# Patient Record
Sex: MALE | Race: WHITE | Employment: OTHER | URBAN - METROPOLITAN AREA
[De-identification: names, ages, dates, MRNs, and addresses within clinical notes are randomized per-mention and may not be internally consistent; named-entity substitution may affect disease eponyms.]

---

## 2017-01-04 PROBLEM — M10.00 IDIOPATHIC GOUT, UNSPECIFIED CHRONICITY, UNSPECIFIED SITE: Status: ACTIVE | Noted: 2017-01-04

## 2017-06-27 ENCOUNTER — LAB ENCOUNTER (OUTPATIENT)
Dept: LAB | Age: 57
End: 2017-06-27
Attending: PHYSICIAN ASSISTANT
Payer: COMMERCIAL

## 2017-06-27 DIAGNOSIS — L08.9 STAPH SKIN INFECTION: ICD-10-CM

## 2017-06-27 DIAGNOSIS — B95.8 STAPH SKIN INFECTION: ICD-10-CM

## 2017-06-27 PROCEDURE — 87205 SMEAR GRAM STAIN: CPT

## 2017-06-27 PROCEDURE — 87070 CULTURE OTHR SPECIMN AEROBIC: CPT

## 2017-07-11 PROCEDURE — 81003 URINALYSIS AUTO W/O SCOPE: CPT | Performed by: INTERNAL MEDICINE

## 2017-08-30 ENCOUNTER — HOSPITAL ENCOUNTER (OUTPATIENT)
Age: 57
Discharge: HOME | End: 2017-08-30
Payer: COMMERCIAL

## 2017-08-30 VITALS
TEMPERATURE: 98 F | SYSTOLIC BLOOD PRESSURE: 131 MMHG | DIASTOLIC BLOOD PRESSURE: 71 MMHG | HEART RATE: 67 BPM | OXYGEN SATURATION: 97 % | RESPIRATION RATE: 16 BRPM

## 2017-08-30 DIAGNOSIS — L30.9 PERIOCULAR DERMATITIS: ICD-10-CM

## 2017-08-30 DIAGNOSIS — H10.32 ACUTE CONJUNCTIVITIS OF LEFT EYE, UNSPECIFIED ACUTE CONJUNCTIVITIS TYPE: Primary | ICD-10-CM

## 2017-08-30 PROCEDURE — 99213 OFFICE O/P EST LOW 20 MIN: CPT

## 2017-08-30 PROCEDURE — 99214 OFFICE O/P EST MOD 30 MIN: CPT

## 2017-08-30 RX ORDER — OFLOXACIN 3 MG/ML
2 SOLUTION/ DROPS OPHTHALMIC EVERY 4 HOURS
Qty: 10 ML | Refills: 0 | Status: SHIPPED | OUTPATIENT
Start: 2017-08-30 | End: 2018-01-08 | Stop reason: ALTCHOICE

## 2017-08-30 NOTE — ED PROVIDER NOTES
Patient Seen in: [facility]    History   Patient presents with:  Conjunctivitis    Stated Complaint: 2 DAYS PINK EYE    HPI    Patient is a pleasant 80-year-old male.   Patient arrives for evaluation of acute injection of the lef daily   probenecid 500 MG Oral Tab,  Take 2 tablets (1,000 mg total) by mouth 2 (two) times daily with meals. dronedarone HCl (MULTAQ) 400 MG Oral Tab,  Take 400 mg by mouth 2 (two) times daily with meals.    Metoprolol Succinate ER (TOPROL-XL) 50 MG Oral Supple, full range of motion, no thyromegaly or lymphadenopathy. Eye examination: EOMs are intact, normal conjunctival  ENT: Diffuse uniform red discoloration of the left conjunctivo-. No obvious foreign body with eyelid eversion. EOMs are intact.   PERR (four) hours. , Normal, Disp-10 mL, R-0    metRONIDAZOLE 0.75 % External Cream  Apply 1 Application topically 2 (two) times daily. , Normal, Disp-45 g, R-0

## 2017-08-30 NOTE — ED INITIAL ASSESSMENT (HPI)
Complains of pain and redness on left eye since yesterday. Stays painful and \"feels like sand on eye\" and was tearing a lot.

## 2018-01-11 PROBLEM — B35.1 ONYCHOMYCOSIS: Status: ACTIVE | Noted: 2018-01-11

## 2018-01-17 PROBLEM — Z51.81 ENCOUNTER FOR THERAPEUTIC DRUG MONITORING: Status: ACTIVE | Noted: 2018-01-17

## 2018-01-19 PROCEDURE — 82607 VITAMIN B-12: CPT | Performed by: INTERNAL MEDICINE

## 2018-01-19 PROCEDURE — 82397 CHEMILUMINESCENT ASSAY: CPT | Performed by: INTERNAL MEDICINE

## 2018-01-19 PROCEDURE — 84402 ASSAY OF FREE TESTOSTERONE: CPT | Performed by: INTERNAL MEDICINE

## 2018-01-19 PROCEDURE — 84403 ASSAY OF TOTAL TESTOSTERONE: CPT | Performed by: INTERNAL MEDICINE

## 2018-03-24 ENCOUNTER — HOSPITAL ENCOUNTER (OUTPATIENT)
Age: 58
Discharge: HOME | End: 2018-03-24
Payer: COMMERCIAL

## 2018-03-24 VITALS
SYSTOLIC BLOOD PRESSURE: 126 MMHG | TEMPERATURE: 98 F | DIASTOLIC BLOOD PRESSURE: 80 MMHG | OXYGEN SATURATION: 99 % | HEART RATE: 68 BPM | RESPIRATION RATE: 16 BRPM

## 2018-03-24 DIAGNOSIS — H20.9 IRITIS: Primary | ICD-10-CM

## 2018-03-24 DIAGNOSIS — H10.32 ACUTE CONJUNCTIVITIS OF LEFT EYE, UNSPECIFIED ACUTE CONJUNCTIVITIS TYPE: ICD-10-CM

## 2018-03-24 PROCEDURE — 99213 OFFICE O/P EST LOW 20 MIN: CPT

## 2018-03-24 PROCEDURE — 99214 OFFICE O/P EST MOD 30 MIN: CPT

## 2018-03-24 RX ORDER — CIPROFLOXACIN HYDROCHLORIDE 3.5 MG/ML
2 SOLUTION/ DROPS TOPICAL
Qty: 1 BOTTLE | Refills: 0 | Status: SHIPPED | OUTPATIENT
Start: 2018-03-24 | End: 2018-04-02 | Stop reason: ALTCHOICE

## 2018-03-24 RX ORDER — TETRACAINE HYDROCHLORIDE 5 MG/ML
1 SOLUTION OPHTHALMIC ONCE
Status: COMPLETED | OUTPATIENT
Start: 2018-03-24 | End: 2018-03-24

## 2018-03-24 NOTE — ED INITIAL ASSESSMENT (HPI)
Onset Wednesday right eye redness. Washing with baby shampoo for the past 3 days, not any better. Was seen by [facility] last summer and was told to washed eye with baby shampoo but has not been doing it until 3 days ago.

## 2018-03-24 NOTE — ED PROVIDER NOTES
Patient Seen in: [facility]    History   Patient presents with:  Redness    Stated Complaint: Left eye redness    HPI    49-year-old male with extensive history of eye issues here with complaint of left eye redness with irritati Packs/day: 0.50      Years: 30.00        Types: Cigars     Quit date: 8/1/1994  Smokeless tobacco: Never Used                      Comment: cigars only per pt. Anmol Solero Patient has not had            tobacco for 7 months (see Patient Ema but instructed patient to make an appointment in [place] for Monday or Tuesday with an optometrist if unable to get in with ophthalmology.     ED Course as of Mar 24 1233  ------------------------------------------------------------       [facility]     Herb

## 2018-05-13 ENCOUNTER — HOSPITAL ENCOUNTER (OUTPATIENT)
Age: 58
Discharge: HOME | End: 2018-05-13
Payer: COMMERCIAL

## 2018-05-13 VITALS
SYSTOLIC BLOOD PRESSURE: 130 MMHG | HEART RATE: 60 BPM | DIASTOLIC BLOOD PRESSURE: 66 MMHG | OXYGEN SATURATION: 97 % | TEMPERATURE: 97 F | RESPIRATION RATE: 16 BRPM

## 2018-05-13 DIAGNOSIS — H20.9 IRITIS: ICD-10-CM

## 2018-05-13 DIAGNOSIS — H10.32 ACUTE CONJUNCTIVITIS OF LEFT EYE, UNSPECIFIED ACUTE CONJUNCTIVITIS TYPE: Primary | ICD-10-CM

## 2018-05-13 PROCEDURE — 99213 OFFICE O/P EST LOW 20 MIN: CPT

## 2018-05-13 RX ORDER — CIPROFLOXACIN HYDROCHLORIDE 3.5 MG/ML
2 SOLUTION/ DROPS TOPICAL
Qty: 1 BOTTLE | Refills: 0 | Status: SHIPPED | OUTPATIENT
Start: 2018-05-13 | End: 2018-05-23

## 2018-05-13 RX ORDER — TETRACAINE HYDROCHLORIDE 5 MG/ML
1 SOLUTION OPHTHALMIC ONCE
Status: COMPLETED | OUTPATIENT
Start: 2018-05-13 | End: 2018-05-13

## 2018-05-13 NOTE — ED PROVIDER NOTES
Patient Seen in: [facility] In [address] [place] [address],[address]    History   Patient presents with:  Eye Problem    Stated Complaint: RED RIGHT EYE/PAIN    HPI    CHIEF COMPLAINT: Eye redness and irritation    HISTORY OF PRESENT ILLNESS: Patient is a 51-year-old m COLONOSCOPY, POSSIBLE BIOPSY,                POSSIBLE POLYPECTOMY 07654;  Surgeon: Sushila Shelley MD;  Location: [address] [place] [address]    Family history reviewed and is not pertinent to presenting problem.     Smoking status: Former Smoke chamber exam shows no evidence of hyphema or cell/flare.   No dendrites; normal funduscopic exam.  Other eye unaffected    ENT: Normal oropharynx, normal nasal exam.    ED Course   Labs Reviewed - No data to display  Patient symptoms are consistent with iri

## 2018-05-13 NOTE — ED INITIAL ASSESSMENT (HPI)
Pt began yesterday with rt eye irritation, and then got worse during the night. Now it is red, irritated and tearing.   Had similar issue with left eye 1 month ago

## 2018-10-16 ENCOUNTER — HOSPITAL ENCOUNTER (EMERGENCY)
Age: 58
Discharge: HOME | End: 2018-10-16
Attending: EMERGENCY MEDICINE
Payer: COMMERCIAL

## 2018-10-16 ENCOUNTER — APPOINTMENT (OUTPATIENT)
Dept: GENERAL RADIOLOGY | Age: 58
End: 2018-10-16
Attending: PHYSICIAN ASSISTANT
Payer: COMMERCIAL

## 2018-10-16 VITALS
OXYGEN SATURATION: 98 % | TEMPERATURE: 98 F | WEIGHT: 315 LBS | BODY MASS INDEX: 40.43 KG/M2 | HEIGHT: 74 IN | HEART RATE: 54 BPM | SYSTOLIC BLOOD PRESSURE: 134 MMHG | RESPIRATION RATE: 16 BRPM | DIASTOLIC BLOOD PRESSURE: 76 MMHG

## 2018-10-16 DIAGNOSIS — S62.635B OPEN DISPLACED FRACTURE OF DISTAL PHALANX OF LEFT RING FINGER, INITIAL ENCOUNTER: Primary | ICD-10-CM

## 2018-10-16 PROCEDURE — 73140 X-RAY EXAM OF FINGER(S): CPT | Performed by: PHYSICIAN ASSISTANT

## 2018-10-16 PROCEDURE — 99283 EMERGENCY DEPT VISIT LOW MDM: CPT

## 2018-10-16 PROCEDURE — 99284 EMERGENCY DEPT VISIT MOD MDM: CPT

## 2018-10-16 PROCEDURE — 90471 IMMUNIZATION ADMIN: CPT

## 2018-10-16 RX ORDER — BUPIVACAINE HYDROCHLORIDE 5 MG/ML
INJECTION, SOLUTION EPIDURAL; INTRACAUDAL
Status: COMPLETED
Start: 2018-10-16 | End: 2018-10-16

## 2018-10-16 RX ORDER — CEPHALEXIN 500 MG/1
500 CAPSULE ORAL 4 TIMES DAILY
Qty: 40 CAPSULE | Refills: 0 | Status: SHIPPED | OUTPATIENT
Start: 2018-10-16 | End: 2018-10-26

## 2018-10-16 RX ORDER — CEPHALEXIN 250 MG/5ML
500 POWDER, FOR SUSPENSION ORAL 4 TIMES DAILY
Qty: 400 ML | Refills: 0 | Status: SHIPPED | OUTPATIENT
Start: 2018-10-16 | End: 2018-10-16 | Stop reason: CLARIF

## 2018-10-16 NOTE — ED PROVIDER NOTES
Patient Seen in: [facility] [place]    History   Patient presents with:  Laceration Abrasion (integumentary)    Stated Complaint: lt hand lac 3rd digit    HPI    CHIEF COMPLAINT: Left Ring finger injury    HISTORY OF PRESENT MARK Packs/day: 0.50        Years: 30.00        Pack years: 15        Types: Cigars        Quit date: 1994        Years since quittin.2      Smokeless tobacco: Never Used      Tobacco comment: cigars only per pt. Antonio Rodriguez Patient has not had tobacco for Patient will be discharged home to follow-up with orthopedics within the next 1-2 days. Patient will leave the Gelfoam in place along with the dressing and should not remove the dressing until orthopedics.   Patient was placed on Keflex 500 mg 4 times servando

## 2018-10-18 NOTE — H&P (VIEW-ONLY)
HISTORY AND PHYSICAL    Sameer Hammond  10/18/2018   CHIEF COMPLAINT   Patient presents with:  Finger Pain: DOI 10/16/2018. patient finger was injured by mitre saw. no pain. taking Antibiotics Kefelx.       HISTORY OF PRESENT ILLNESS   Eddie Norris 400 mg by mouth 2 (two) times daily with meals. Disp:  Rfl:    Metoprolol Succinate ER (TOPROL-XL) 50 MG Oral Take 50 mg by mouth daily. Disp:  Rfl:    aspirin (BABY ASPIRIN) 81 MG Oral Chew Tab Chew 81 mg by mouth daily. Two tablets.   Disp:  Rfl:    Multi lamin      Children: 2      Exercise: walks      Employment: [employer]      Caffeine intake:2 pot coffee/d, [employer]            Family History:  Family History   Problem Relation Age of Onset   • Cancer Father Motion:  Elbows: Normal carrying angle and a full arc of motion into flexion and extension, supination and pronation.   Wrists: Full and symmetric arc of motion  All fingers demonstrated a full, free and uninhibited range of motion with the exception of the

## 2018-10-25 ENCOUNTER — HOSPITAL ENCOUNTER (OUTPATIENT)
Facility: HOSPITAL | Age: 58
Setting detail: HOSPITAL OUTPATIENT SURGERY
Discharge: HOME | End: 2018-10-25
Attending: ORTHOPAEDIC SURGERY | Admitting: ORTHOPAEDIC SURGERY
Payer: COMMERCIAL

## 2018-10-25 VITALS
BODY MASS INDEX: 40.43 KG/M2 | OXYGEN SATURATION: 99 % | DIASTOLIC BLOOD PRESSURE: 63 MMHG | HEIGHT: 74 IN | SYSTOLIC BLOOD PRESSURE: 129 MMHG | WEIGHT: 315 LBS | RESPIRATION RATE: 18 BRPM | HEART RATE: 50 BPM | TEMPERATURE: 97 F

## 2018-10-25 DIAGNOSIS — S61.319A LACERATION OF NAIL BED OF FINGER, INITIAL ENCOUNTER: ICD-10-CM

## 2018-10-25 PROCEDURE — 0HQQXZZ REPAIR FINGER NAIL, EXTERNAL APPROACH: ICD-10-PCS | Performed by: ORTHOPAEDIC SURGERY

## 2018-10-25 RX ORDER — HYDROCODONE BITARTRATE AND ACETAMINOPHEN 5; 325 MG/1; MG/1
1 TABLET ORAL EVERY 6 HOURS PRN
Qty: 7 TABLET | Refills: 0 | Status: SHIPPED | OUTPATIENT
Start: 2018-10-25 | End: 2018-11-01 | Stop reason: ALTCHOICE

## 2018-10-25 RX ORDER — LIDOCAINE HYDROCHLORIDE 10 MG/ML
INJECTION, SOLUTION INFILTRATION; PERINEURAL AS NEEDED
Status: DISCONTINUED | OUTPATIENT
Start: 2018-10-25 | End: 2018-10-25 | Stop reason: HOSPADM

## 2018-10-25 RX ORDER — BUPIVACAINE HYDROCHLORIDE 5 MG/ML
INJECTION, SOLUTION EPIDURAL; INTRACAUDAL AS NEEDED
Status: DISCONTINUED | OUTPATIENT
Start: 2018-10-25 | End: 2018-10-25 | Stop reason: HOSPADM

## 2018-10-25 RX ORDER — BACITRACIN 50000 [USP'U]/1
INJECTION, POWDER, LYOPHILIZED, FOR SOLUTION INTRAMUSCULAR AS NEEDED
Status: DISCONTINUED | OUTPATIENT
Start: 2018-10-25 | End: 2018-10-25 | Stop reason: HOSPADM

## 2018-10-25 NOTE — INTERVAL H&P NOTE
Pre-op Diagnosis: Laceration of nail bed of finger, initial encounter [S61.319A]    The above referenced H&P was reviewed by Anita De La Fuente MD on 10/25/2018, the patient was examined and no significant changes have occurred in the patient's condition sin

## 2018-10-25 NOTE — BRIEF OP NOTE
Pre-Operative Diagnosis: Laceration of nail bed of finger, initial encounter [S61.319A]     Post-Operative Diagnosis: Lacerartion of nail bed of finger, initial encounter      Procedure Performed:   Procedure(s):  INCISION AND DRAINAGE OF 4TH FINGER LEFT H

## 2018-10-26 NOTE — OPERATIVE REPORT
Operative Note    Patient: Divine Romero MRN: [id number]     YOB: 1960  Age: 62year old  Sex: male    Unit: [facility] Room/Bed: [facility] PRE ASCC/ PRE ASCC * Location: [facility]     Date of Procedure: 10/25/2018     Preoperative to the patient and they wished to proceed. Description of Procedure: The patient was identified in preoperative holding area and his left fourth finger was marked.   He was brought back to the operating room and a timeout was performed identifying th the finger was perfused before covering the tip of the finger with 4 x 4 gauze. This was secured using finger Kerlix and Coban. The patient tolerated the procedure well was transferred the PACU in stable condition.   There were no apparent complications

## 2018-11-05 PROBLEM — M79.642 LEFT HAND PAIN: Status: ACTIVE | Noted: 2018-11-05

## 2019-08-13 PROCEDURE — 81003 URINALYSIS AUTO W/O SCOPE: CPT | Performed by: INTERNAL MEDICINE

## 2019-08-13 PROCEDURE — 86803 HEPATITIS C AB TEST: CPT | Performed by: INTERNAL MEDICINE

## 2019-08-14 PROBLEM — E88.81 METABOLIC SYNDROME: Status: ACTIVE | Noted: 2019-08-14

## 2019-08-14 PROBLEM — E88.810 METABOLIC SYNDROME: Status: ACTIVE | Noted: 2019-08-14

## 2021-07-16 ENCOUNTER — LAB ENCOUNTER (OUTPATIENT)
Dept: LAB | Age: 61
End: 2021-07-16
Attending: INTERNAL MEDICINE
Payer: COMMERCIAL

## 2021-07-16 DIAGNOSIS — I48.91 A-FIB (HCC): ICD-10-CM

## 2021-07-17 LAB — SARS-COV-2 RNA RESP QL NAA+PROBE: NOT DETECTED

## 2021-07-19 ENCOUNTER — HOSPITAL ENCOUNTER (OUTPATIENT)
Dept: INTERVENTIONAL RADIOLOGY/VASCULAR | Facility: HOSPITAL | Age: 61
Discharge: HOME | End: 2021-07-19
Attending: INTERNAL MEDICINE | Admitting: INTERNAL MEDICINE
Payer: COMMERCIAL

## 2021-07-19 VITALS
RESPIRATION RATE: 16 BRPM | SYSTOLIC BLOOD PRESSURE: 117 MMHG | TEMPERATURE: 98 F | DIASTOLIC BLOOD PRESSURE: 68 MMHG | HEART RATE: 50 BPM | WEIGHT: 309 LBS | HEIGHT: 74 IN | OXYGEN SATURATION: 100 % | BODY MASS INDEX: 39.66 KG/M2

## 2021-07-19 DIAGNOSIS — I48.91 ATRIAL FIBRILLATION (HCC): ICD-10-CM

## 2021-07-19 DIAGNOSIS — I48.91 A-FIB (HCC): Primary | ICD-10-CM

## 2021-07-19 PROCEDURE — 99152 MOD SED SAME PHYS/QHP 5/>YRS: CPT

## 2021-07-19 PROCEDURE — 93010 ELECTROCARDIOGRAM REPORT: CPT | Performed by: INTERNAL MEDICINE

## 2021-07-19 PROCEDURE — 5A2204Z RESTORATION OF CARDIAC RHYTHM, SINGLE: ICD-10-PCS | Performed by: INTERNAL MEDICINE

## 2021-07-19 PROCEDURE — 92960 CARDIOVERSION ELECTRIC EXT: CPT

## 2021-07-19 PROCEDURE — 93005 ELECTROCARDIOGRAM TRACING: CPT

## 2021-07-19 RX ORDER — SODIUM CHLORIDE 9 MG/ML
INJECTION, SOLUTION INTRAVENOUS CONTINUOUS
Status: DISCONTINUED | OUTPATIENT
Start: 2021-07-19 | End: 2021-07-19

## 2021-07-19 NOTE — H&P
[facility]   Progress Note           ASSESSMENT:    1. Paroxysmal atrial fibrillation, currently in atrial fibrillation. 2. Increased BMI. 3. Obstructive sleep apnea, on CPAP. 4. Hypertension. 5. Dyslipidemia. 6. Prediabetes. POSSIBLE BIOPSY, POSSIBLE POLYPECTOMY 58773;  Surgeon: García Navarrete MD;  Location: [address] [place] [address]             Family History   Problem Relation Age of Onset   • Cancer Father           gastric ca   • Diabetes Maternal Grandmother     • Cancer       ROS:   Constitutional: negative for fevers  Eyes: negative for visual disturbance  Ears, nose, mouth, throat, and face: negative for epistaxis  Respiratory: negative for dyspnea on exertion  Cardiovascular: negative for chest pain  Gastrointestinal

## 2021-07-19 NOTE — PROGRESS NOTES
Pt A&O x 4 s/p cardioversion with Dr. Valdo Ashby. Neuro intact. VSS on RA. Pt has no complaints. Discharge instructions reviewed with pt. All questions answered. IV and tele d/rosie. Pt brought to lobby with all belongings.

## 2021-07-20 NOTE — PROCEDURES
[facility]    PATIENT'S NAME: Briseyda Garcia   ATTENDING PHYSICIAN: Tim Mcclure M.D. OPERATING PHYSICIAN: Tim Mcclure M.D.    PATIENT ACCOUNT#:   [de-identified]    [facility]  MEDICAL RECORD #:   [id number]       DATE OF B

## 2021-07-21 LAB
ATRIAL RATE: 67 BPM
P AXIS: 52 DEGREES
P-R INTERVAL: 238 MS
Q-T INTERVAL: 420 MS
QRS DURATION: 82 MS
QTC CALCULATION (BEZET): 443 MS
R AXIS: 38 DEGREES
T AXIS: 34 DEGREES
VENTRICULAR RATE: 67 BPM

## 2021-08-13 PROBLEM — M17.11 PRIMARY OSTEOARTHRITIS OF RIGHT KNEE: Status: ACTIVE | Noted: 2021-08-13

## 2021-11-05 ENCOUNTER — HOSPITAL ENCOUNTER (OUTPATIENT)
Dept: INTERVENTIONAL RADIOLOGY/VASCULAR | Facility: HOSPITAL | Age: 61
Discharge: HOME | End: 2021-11-05
Attending: INTERNAL MEDICINE
Payer: COMMERCIAL

## 2021-11-05 DIAGNOSIS — I48.0 PAF (PAROXYSMAL ATRIAL FIBRILLATION) (HCC): Primary | ICD-10-CM

## 2022-02-21 PROBLEM — M79.642 LEFT HAND PAIN: Status: RESOLVED | Noted: 2018-11-05 | Resolved: 2022-02-21

## 2022-02-21 PROBLEM — B35.1 ONYCHOMYCOSIS: Status: RESOLVED | Noted: 2018-01-11 | Resolved: 2022-02-21

## 2022-02-21 PROBLEM — Z51.81 ENCOUNTER FOR THERAPEUTIC DRUG MONITORING: Status: RESOLVED | Noted: 2018-01-17 | Resolved: 2022-02-21

## 2022-12-10 ENCOUNTER — APPOINTMENT (OUTPATIENT)
Dept: GENERAL RADIOLOGY | Age: 62
End: 2022-12-10
Attending: NURSE PRACTITIONER
Payer: COMMERCIAL

## 2022-12-10 ENCOUNTER — HOSPITAL ENCOUNTER (OUTPATIENT)
Age: 62
Discharge: HOME | End: 2022-12-10
Payer: COMMERCIAL

## 2022-12-10 VITALS
HEART RATE: 55 BPM | OXYGEN SATURATION: 97 % | BODY MASS INDEX: 34.46 KG/M2 | DIASTOLIC BLOOD PRESSURE: 69 MMHG | RESPIRATION RATE: 16 BRPM | TEMPERATURE: 98 F | HEIGHT: 73 IN | WEIGHT: 260 LBS | SYSTOLIC BLOOD PRESSURE: 114 MMHG

## 2022-12-10 DIAGNOSIS — M79.672 LEFT FOOT PAIN: Primary | ICD-10-CM

## 2022-12-10 PROCEDURE — 99204 OFFICE O/P NEW MOD 45 MIN: CPT

## 2022-12-10 PROCEDURE — 73630 X-RAY EXAM OF FOOT: CPT | Performed by: NURSE PRACTITIONER

## 2022-12-10 PROCEDURE — 99213 OFFICE O/P EST LOW 20 MIN: CPT

## 2022-12-10 RX ORDER — DICLOFENAC SODIUM 75 MG/1
TABLET, DELAYED RELEASE ORAL
COMMUNITY
Start: 2022-11-10

## 2022-12-10 RX ORDER — HYDROXYUREA 500 MG/1
CAPSULE ORAL
COMMUNITY
Start: 2022-12-05

## 2022-12-10 RX ORDER — PREDNISONE 20 MG/1
40 TABLET ORAL DAILY
Qty: 10 TABLET | Refills: 0 | Status: SHIPPED | OUTPATIENT
Start: 2022-12-10 | End: 2022-12-15

## 2022-12-10 RX ORDER — ASPIRIN 81 MG/1
81 TABLET ORAL DAILY
COMMUNITY

## 2022-12-10 NOTE — ED INITIAL ASSESSMENT (HPI)
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 Admits to hx of gout as well. Pain started on Thursday and worsening overnight. Slight redness and swelling observed. Pain to base of big toe.

## 2022-12-10 NOTE — DISCHARGE INSTRUCTIONS
Gout:  Take prednisone with food, as directed (with food)      Rest.  Apply cold packs to affected area 3-4 times per day. Elevate extremity often    Avoid foods high in purines: anchovies, organ meats, mushrooms, spinach.     Weight reduction/ maintenance  Reduce alcohol intake   Manage high blood pressure    Follow up with your doctor or return to our faciltiy for new or worsening symptoms

## 2023-08-17 ENCOUNTER — APPOINTMENT (OUTPATIENT)
Dept: GENERAL RADIOLOGY | Age: 63
End: 2023-08-17
Attending: PHYSICIAN ASSISTANT
Payer: COMMERCIAL

## 2023-08-17 ENCOUNTER — HOSPITAL ENCOUNTER (OUTPATIENT)
Age: 63
Discharge: HOME | End: 2023-08-17
Payer: COMMERCIAL

## 2023-08-17 VITALS
RESPIRATION RATE: 16 BRPM | TEMPERATURE: 98 F | BODY MASS INDEX: 33.37 KG/M2 | DIASTOLIC BLOOD PRESSURE: 64 MMHG | HEART RATE: 57 BPM | WEIGHT: 260 LBS | HEIGHT: 74 IN | OXYGEN SATURATION: 98 % | SYSTOLIC BLOOD PRESSURE: 132 MMHG

## 2023-08-17 DIAGNOSIS — M25.562 KNEE PAIN, LEFT ANTERIOR: Primary | ICD-10-CM

## 2023-08-17 DIAGNOSIS — Q79.8: ICD-10-CM

## 2023-08-17 PROCEDURE — 99213 OFFICE O/P EST LOW 20 MIN: CPT

## 2023-08-17 PROCEDURE — 73560 X-RAY EXAM OF KNEE 1 OR 2: CPT | Performed by: PHYSICIAN ASSISTANT

## 2023-08-17 PROCEDURE — 99214 OFFICE O/P EST MOD 30 MIN: CPT

## 2023-08-17 RX ORDER — CYCLOBENZAPRINE HCL 10 MG
10 TABLET ORAL NIGHTLY
Qty: 20 TABLET | Refills: 0 | Status: SHIPPED | OUTPATIENT
Start: 2023-08-17 | End: 2023-09-08 | Stop reason: ALTCHOICE

## 2023-08-17 NOTE — DISCHARGE INSTRUCTIONS
Please return to the ER/clinic if symptoms worsen. Follow-up with your PCP in 24-48 hours as needed. The areas where your tendons and ligaments attach to your bones are called entheses. If these areas become painful and inflamed, it's called enthesitis. This is also known as enthesopathy. You'll notice this type of pain more when you use the joint or attachment point that's affected by enthesopathy. Enthesopathy refers to a problem with the attachment of tendons, ligaments, or components of a joint onto the bone. Treatments can include medications, physical therapy, orthotics, and lifestyle changes.

## 2023-09-11 ENCOUNTER — LABORATORY ENCOUNTER (OUTPATIENT)
Dept: LAB | Age: 63
End: 2023-09-11
Attending: ORTHOPAEDIC SURGERY
Payer: COMMERCIAL

## 2023-09-11 DIAGNOSIS — M17.11 PRIMARY OSTEOARTHRITIS OF RIGHT KNEE: ICD-10-CM

## 2023-09-11 PROCEDURE — 86850 RBC ANTIBODY SCREEN: CPT

## 2023-09-11 PROCEDURE — 86900 BLOOD TYPING SEROLOGIC ABO: CPT

## 2023-09-11 PROCEDURE — 87081 CULTURE SCREEN ONLY: CPT

## 2023-09-11 PROCEDURE — 86901 BLOOD TYPING SEROLOGIC RH(D): CPT

## 2023-09-12 ENCOUNTER — LAB ENCOUNTER (OUTPATIENT)
Dept: LAB | Age: 63
End: 2023-09-12
Attending: ORTHOPAEDIC SURGERY
Payer: COMMERCIAL

## 2023-09-12 DIAGNOSIS — M17.11 PRIMARY OSTEOARTHRITIS OF RIGHT KNEE: ICD-10-CM

## 2023-09-12 LAB
ANTIBODY SCREEN: NEGATIVE
RH BLOOD TYPE: POSITIVE

## 2023-09-12 PROCEDURE — 86900 BLOOD TYPING SEROLOGIC ABO: CPT

## 2023-09-12 PROCEDURE — 86901 BLOOD TYPING SEROLOGIC RH(D): CPT

## 2023-09-12 PROCEDURE — 86850 RBC ANTIBODY SCREEN: CPT

## 2023-09-23 ENCOUNTER — ANESTHESIA EVENT (OUTPATIENT)
Dept: SURGERY | Facility: HOSPITAL | Age: 63
End: 2023-09-23
Payer: COMMERCIAL

## 2023-09-25 ENCOUNTER — HOSPITAL ENCOUNTER (OUTPATIENT)
Facility: HOSPITAL | Age: 63
Discharge: HOME HEALTH | End: 2023-09-26
Attending: ORTHOPAEDIC SURGERY | Admitting: ORTHOPAEDIC SURGERY
Payer: COMMERCIAL

## 2023-09-25 ENCOUNTER — ANESTHESIA (OUTPATIENT)
Dept: SURGERY | Facility: HOSPITAL | Age: 63
End: 2023-09-25
Payer: COMMERCIAL

## 2023-09-25 ENCOUNTER — APPOINTMENT (OUTPATIENT)
Dept: GENERAL RADIOLOGY | Facility: HOSPITAL | Age: 63
End: 2023-09-25
Attending: PHYSICIAN ASSISTANT
Payer: COMMERCIAL

## 2023-09-25 DIAGNOSIS — M17.11 PRIMARY OSTEOARTHRITIS OF RIGHT KNEE: Primary | ICD-10-CM

## 2023-09-25 PROCEDURE — 73560 X-RAY EXAM OF KNEE 1 OR 2: CPT | Performed by: PHYSICIAN ASSISTANT

## 2023-09-25 PROCEDURE — 0SRC0J9 REPLACEMENT OF RIGHT KNEE JOINT WITH SYNTHETIC SUBSTITUTE, CEMENTED, OPEN APPROACH: ICD-10-PCS | Performed by: ORTHOPAEDIC SURGERY

## 2023-09-25 PROCEDURE — 94660 CPAP INITIATION&MGMT: CPT

## 2023-09-25 PROCEDURE — 97161 PT EVAL LOW COMPLEX 20 MIN: CPT

## 2023-09-25 PROCEDURE — 88311 DECALCIFY TISSUE: CPT | Performed by: ORTHOPAEDIC SURGERY

## 2023-09-25 PROCEDURE — 97530 THERAPEUTIC ACTIVITIES: CPT

## 2023-09-25 PROCEDURE — 88305 TISSUE EXAM BY PATHOLOGIST: CPT | Performed by: ORTHOPAEDIC SURGERY

## 2023-09-25 PROCEDURE — 76942 ECHO GUIDE FOR BIOPSY: CPT | Performed by: ANESTHESIOLOGY

## 2023-09-25 DEVICE — ATTUNE KNEE SYSTEM FEMORAL POSTERIOR STABILIZED SIZE 8 RIGHT CEMENTED
Type: IMPLANTABLE DEVICE | Site: KNEE | Status: FUNCTIONAL
Brand: ATTUNE

## 2023-09-25 DEVICE — ATTUNE KNEE SYSTEM TIBIAL INSERT ROTATING PLATFORM POSTERIOR STABILIZED 8 6MM AOX
Type: IMPLANTABLE DEVICE | Site: KNEE | Status: FUNCTIONAL
Brand: ATTUNE

## 2023-09-25 DEVICE — ATTUNE KNEE SYSTEM TIBIAL BASE ROTATING PLATFORM SIZE 8 CEMENTED
Type: IMPLANTABLE DEVICE | Site: KNEE | Status: FUNCTIONAL
Brand: ATTUNE

## 2023-09-25 DEVICE — SMARTSET HIGH PERFORMANCE MV MEDIUM VISCOSITY BONE CEMENT 40G
Type: IMPLANTABLE DEVICE | Site: KNEE | Status: FUNCTIONAL
Brand: SMARTSET

## 2023-09-25 DEVICE — ATTUNE PATELLA MEDIALIZED ANATOMIC 38MM CEMENTED AOX
Type: IMPLANTABLE DEVICE | Site: KNEE | Status: FUNCTIONAL
Brand: ATTUNE

## 2023-09-25 RX ORDER — ACETAMINOPHEN 325 MG/1
650 TABLET ORAL ONCE
Status: COMPLETED | OUTPATIENT
Start: 2023-09-25 | End: 2023-09-25

## 2023-09-25 RX ORDER — BUPRENORPHINE HYDROCHLORIDE 0.32 MG/ML
INJECTION INTRAMUSCULAR; INTRAVENOUS AS NEEDED
Status: DISCONTINUED | OUTPATIENT
Start: 2023-09-25 | End: 2023-09-25 | Stop reason: SURG

## 2023-09-25 RX ORDER — NALOXONE HYDROCHLORIDE 0.4 MG/ML
80 INJECTION, SOLUTION INTRAMUSCULAR; INTRAVENOUS; SUBCUTANEOUS AS NEEDED
Status: DISCONTINUED | OUTPATIENT
Start: 2023-09-25 | End: 2023-09-25 | Stop reason: HOSPADM

## 2023-09-25 RX ORDER — MIDAZOLAM HYDROCHLORIDE 1 MG/ML
1 INJECTION INTRAMUSCULAR; INTRAVENOUS EVERY 5 MIN PRN
Status: DISCONTINUED | OUTPATIENT
Start: 2023-09-25 | End: 2023-09-25 | Stop reason: HOSPADM

## 2023-09-25 RX ORDER — ACETAMINOPHEN 325 MG/1
TABLET ORAL
Status: COMPLETED
Start: 2023-09-25 | End: 2023-09-25

## 2023-09-25 RX ORDER — DOCUSATE SODIUM 100 MG/1
100 CAPSULE, LIQUID FILLED ORAL 2 TIMES DAILY
Status: DISCONTINUED | OUTPATIENT
Start: 2023-09-25 | End: 2023-09-26

## 2023-09-25 RX ORDER — SODIUM CHLORIDE, SODIUM LACTATE, POTASSIUM CHLORIDE, CALCIUM CHLORIDE 600; 310; 30; 20 MG/100ML; MG/100ML; MG/100ML; MG/100ML
INJECTION, SOLUTION INTRAVENOUS CONTINUOUS
Status: DISCONTINUED | OUTPATIENT
Start: 2023-09-25 | End: 2023-09-26

## 2023-09-25 RX ORDER — TIZANIDINE 2 MG/1
2 TABLET ORAL EVERY 6 HOURS PRN
Status: DISCONTINUED | OUTPATIENT
Start: 2023-09-25 | End: 2023-09-26

## 2023-09-25 RX ORDER — LOSARTAN POTASSIUM 25 MG/1
25 TABLET ORAL DAILY
Status: DISCONTINUED | OUTPATIENT
Start: 2023-09-25 | End: 2023-09-26

## 2023-09-25 RX ORDER — ROSUVASTATIN CALCIUM 5 MG/1
10 TABLET, COATED ORAL NIGHTLY
Status: DISCONTINUED | OUTPATIENT
Start: 2023-09-25 | End: 2023-09-26

## 2023-09-25 RX ORDER — ENEMA 19; 7 G/133ML; G/133ML
1 ENEMA RECTAL ONCE AS NEEDED
Status: DISCONTINUED | OUTPATIENT
Start: 2023-09-25 | End: 2023-09-26

## 2023-09-25 RX ORDER — ACETAMINOPHEN 500 MG
1000 TABLET ORAL ONCE
Status: DISCONTINUED | OUTPATIENT
Start: 2023-09-25 | End: 2023-09-25 | Stop reason: HOSPADM

## 2023-09-25 RX ORDER — HYDROXYUREA 500 MG/1
500 CAPSULE ORAL DAILY
Status: DISCONTINUED | OUTPATIENT
Start: 2023-09-25 | End: 2023-09-26

## 2023-09-25 RX ORDER — PROCHLORPERAZINE EDISYLATE 5 MG/ML
5 INJECTION INTRAMUSCULAR; INTRAVENOUS EVERY 8 HOURS PRN
Status: DISCONTINUED | OUTPATIENT
Start: 2023-09-25 | End: 2023-09-25

## 2023-09-25 RX ORDER — MIDAZOLAM HYDROCHLORIDE 1 MG/ML
INJECTION INTRAMUSCULAR; INTRAVENOUS AS NEEDED
Status: DISCONTINUED | OUTPATIENT
Start: 2023-09-25 | End: 2023-09-25 | Stop reason: SURG

## 2023-09-25 RX ORDER — HYDROMORPHONE HYDROCHLORIDE 1 MG/ML
0.2 INJECTION, SOLUTION INTRAMUSCULAR; INTRAVENOUS; SUBCUTANEOUS EVERY 5 MIN PRN
Status: DISCONTINUED | OUTPATIENT
Start: 2023-09-25 | End: 2023-09-25 | Stop reason: HOSPADM

## 2023-09-25 RX ORDER — POLYETHYLENE GLYCOL 3350 17 G/17G
17 POWDER, FOR SOLUTION ORAL DAILY PRN
Status: DISCONTINUED | OUTPATIENT
Start: 2023-09-25 | End: 2023-09-25

## 2023-09-25 RX ORDER — TOPIRAMATE 100 MG/1
100 TABLET, FILM COATED ORAL 2 TIMES DAILY
Status: DISCONTINUED | OUTPATIENT
Start: 2023-09-25 | End: 2023-09-26

## 2023-09-25 RX ORDER — DEXAMETHASONE SODIUM PHOSPHATE 10 MG/ML
8 INJECTION, SOLUTION INTRAMUSCULAR; INTRAVENOUS ONCE
Status: COMPLETED | OUTPATIENT
Start: 2023-09-26 | End: 2023-09-26

## 2023-09-25 RX ORDER — CEFAZOLIN SODIUM/WATER 2 G/20 ML
2 SYRINGE (ML) INTRAVENOUS EVERY 8 HOURS
Status: DISCONTINUED | OUTPATIENT
Start: 2023-09-25 | End: 2023-09-25

## 2023-09-25 RX ORDER — DIPHENHYDRAMINE HCL 25 MG
25 CAPSULE ORAL EVERY 4 HOURS PRN
Status: DISCONTINUED | OUTPATIENT
Start: 2023-09-25 | End: 2023-09-26

## 2023-09-25 RX ORDER — PROCHLORPERAZINE EDISYLATE 5 MG/ML
5 INJECTION INTRAMUSCULAR; INTRAVENOUS EVERY 8 HOURS PRN
Status: DISCONTINUED | OUTPATIENT
Start: 2023-09-25 | End: 2023-09-25 | Stop reason: HOSPADM

## 2023-09-25 RX ORDER — HYDROMORPHONE HYDROCHLORIDE 1 MG/ML
0.6 INJECTION, SOLUTION INTRAMUSCULAR; INTRAVENOUS; SUBCUTANEOUS EVERY 5 MIN PRN
Status: DISCONTINUED | OUTPATIENT
Start: 2023-09-25 | End: 2023-09-25 | Stop reason: HOSPADM

## 2023-09-25 RX ORDER — DIPHENHYDRAMINE HYDROCHLORIDE 50 MG/ML
25 INJECTION INTRAMUSCULAR; INTRAVENOUS ONCE AS NEEDED
Status: ACTIVE | OUTPATIENT
Start: 2023-09-25 | End: 2023-09-25

## 2023-09-25 RX ORDER — PROPAFENONE HYDROCHLORIDE 150 MG/1
150 TABLET, COATED ORAL EVERY 8 HOURS SCHEDULED
Status: DISCONTINUED | OUTPATIENT
Start: 2023-09-25 | End: 2023-09-26

## 2023-09-25 RX ORDER — ONDANSETRON 2 MG/ML
INJECTION INTRAMUSCULAR; INTRAVENOUS AS NEEDED
Status: DISCONTINUED | OUTPATIENT
Start: 2023-09-25 | End: 2023-09-25 | Stop reason: SURG

## 2023-09-25 RX ORDER — KETOROLAC TROMETHAMINE 30 MG/ML
30 INJECTION, SOLUTION INTRAMUSCULAR; INTRAVENOUS EVERY 6 HOURS
Status: DISCONTINUED | OUTPATIENT
Start: 2023-09-25 | End: 2023-09-25

## 2023-09-25 RX ORDER — METOPROLOL SUCCINATE 50 MG/1
50 TABLET, EXTENDED RELEASE ORAL DAILY
Status: DISCONTINUED | OUTPATIENT
Start: 2023-09-26 | End: 2023-09-26

## 2023-09-25 RX ORDER — BISACODYL 10 MG
10 SUPPOSITORY, RECTAL RECTAL
Status: DISCONTINUED | OUTPATIENT
Start: 2023-09-25 | End: 2023-09-25

## 2023-09-25 RX ORDER — OXYCODONE HYDROCHLORIDE 10 MG/1
10 TABLET ORAL EVERY 4 HOURS PRN
Status: DISCONTINUED | OUTPATIENT
Start: 2023-09-25 | End: 2023-09-25

## 2023-09-25 RX ORDER — HYDROMORPHONE HYDROCHLORIDE 1 MG/ML
0.4 INJECTION, SOLUTION INTRAMUSCULAR; INTRAVENOUS; SUBCUTANEOUS EVERY 2 HOUR PRN
Status: DISCONTINUED | OUTPATIENT
Start: 2023-09-25 | End: 2023-09-26

## 2023-09-25 RX ORDER — DIPHENHYDRAMINE HYDROCHLORIDE 50 MG/ML
12.5 INJECTION INTRAMUSCULAR; INTRAVENOUS EVERY 4 HOURS PRN
Status: DISCONTINUED | OUTPATIENT
Start: 2023-09-25 | End: 2023-09-26

## 2023-09-25 RX ORDER — MEPERIDINE HYDROCHLORIDE 25 MG/ML
12.5 INJECTION INTRAMUSCULAR; INTRAVENOUS; SUBCUTANEOUS AS NEEDED
Status: DISCONTINUED | OUTPATIENT
Start: 2023-09-25 | End: 2023-09-25 | Stop reason: HOSPADM

## 2023-09-25 RX ORDER — OXYCODONE HYDROCHLORIDE 10 MG/1
10 TABLET ORAL EVERY 4 HOURS PRN
Status: DISCONTINUED | OUTPATIENT
Start: 2023-09-25 | End: 2023-09-26

## 2023-09-25 RX ORDER — ENOXAPARIN SODIUM 100 MG/ML
40 INJECTION SUBCUTANEOUS DAILY
Status: DISCONTINUED | OUTPATIENT
Start: 2023-09-26 | End: 2023-09-26

## 2023-09-25 RX ORDER — ONDANSETRON 2 MG/ML
4 INJECTION INTRAMUSCULAR; INTRAVENOUS EVERY 6 HOURS PRN
Status: DISCONTINUED | OUTPATIENT
Start: 2023-09-25 | End: 2023-09-25 | Stop reason: HOSPADM

## 2023-09-25 RX ORDER — SENNOSIDES 8.6 MG
17.2 TABLET ORAL NIGHTLY
Status: DISCONTINUED | OUTPATIENT
Start: 2023-09-25 | End: 2023-09-26

## 2023-09-25 RX ORDER — ACETAMINOPHEN 325 MG/1
650 TABLET ORAL 4 TIMES DAILY
Status: DISCONTINUED | OUTPATIENT
Start: 2023-09-25 | End: 2023-09-26

## 2023-09-25 RX ORDER — TRANEXAMIC ACID 10 MG/ML
INJECTION, SOLUTION INTRAVENOUS AS NEEDED
Status: DISCONTINUED | OUTPATIENT
Start: 2023-09-25 | End: 2023-09-25 | Stop reason: SURG

## 2023-09-25 RX ORDER — CEFAZOLIN SODIUM/WATER 2 G/20 ML
2 SYRINGE (ML) INTRAVENOUS EVERY 8 HOURS
Status: COMPLETED | OUTPATIENT
Start: 2023-09-25 | End: 2023-09-26

## 2023-09-25 RX ORDER — FAMOTIDINE 10 MG/ML
20 INJECTION, SOLUTION INTRAVENOUS 2 TIMES DAILY
Status: DISCONTINUED | OUTPATIENT
Start: 2023-09-25 | End: 2023-09-26

## 2023-09-25 RX ORDER — ONDANSETRON 2 MG/ML
4 INJECTION INTRAMUSCULAR; INTRAVENOUS EVERY 6 HOURS PRN
Status: DISCONTINUED | OUTPATIENT
Start: 2023-09-25 | End: 2023-09-26

## 2023-09-25 RX ORDER — BISACODYL 10 MG
10 SUPPOSITORY, RECTAL RECTAL
Status: DISCONTINUED | OUTPATIENT
Start: 2023-09-25 | End: 2023-09-26

## 2023-09-25 RX ORDER — CEFAZOLIN SODIUM/WATER 2 G/20 ML
2 SYRINGE (ML) INTRAVENOUS ONCE
Status: COMPLETED | OUTPATIENT
Start: 2023-09-25 | End: 2023-09-25

## 2023-09-25 RX ORDER — FAMOTIDINE 20 MG/1
20 TABLET, FILM COATED ORAL 2 TIMES DAILY
Status: DISCONTINUED | OUTPATIENT
Start: 2023-09-25 | End: 2023-09-25

## 2023-09-25 RX ORDER — PROCHLORPERAZINE EDISYLATE 5 MG/ML
5 INJECTION INTRAMUSCULAR; INTRAVENOUS EVERY 8 HOURS PRN
Status: DISCONTINUED | OUTPATIENT
Start: 2023-09-25 | End: 2023-09-26

## 2023-09-25 RX ORDER — ACETAMINOPHEN 325 MG/1
650 TABLET ORAL 4 TIMES DAILY
Status: DISCONTINUED | OUTPATIENT
Start: 2023-09-25 | End: 2023-09-25

## 2023-09-25 RX ORDER — OXYCODONE HYDROCHLORIDE 5 MG/1
5 TABLET ORAL EVERY 4 HOURS PRN
Status: DISCONTINUED | OUTPATIENT
Start: 2023-09-25 | End: 2023-09-25

## 2023-09-25 RX ORDER — DIPHENHYDRAMINE HYDROCHLORIDE 50 MG/ML
12.5 INJECTION INTRAMUSCULAR; INTRAVENOUS EVERY 4 HOURS PRN
Status: DISCONTINUED | OUTPATIENT
Start: 2023-09-25 | End: 2023-09-25

## 2023-09-25 RX ORDER — TRAMADOL HYDROCHLORIDE 50 MG/1
50 TABLET ORAL EVERY 6 HOURS SCHEDULED
Status: DISCONTINUED | OUTPATIENT
Start: 2023-09-25 | End: 2023-09-25

## 2023-09-25 RX ORDER — DEXAMETHASONE SODIUM PHOSPHATE 4 MG/ML
VIAL (ML) INJECTION AS NEEDED
Status: DISCONTINUED | OUTPATIENT
Start: 2023-09-25 | End: 2023-09-25 | Stop reason: SURG

## 2023-09-25 RX ORDER — TIZANIDINE 2 MG/1
2 TABLET ORAL EVERY 6 HOURS PRN
Status: DISCONTINUED | OUTPATIENT
Start: 2023-09-25 | End: 2023-09-25

## 2023-09-25 RX ORDER — HYDROMORPHONE HYDROCHLORIDE 1 MG/ML
0.8 INJECTION, SOLUTION INTRAMUSCULAR; INTRAVENOUS; SUBCUTANEOUS EVERY 2 HOUR PRN
Status: DISCONTINUED | OUTPATIENT
Start: 2023-09-25 | End: 2023-09-25

## 2023-09-25 RX ORDER — HYDROMORPHONE HYDROCHLORIDE 1 MG/ML
0.4 INJECTION, SOLUTION INTRAMUSCULAR; INTRAVENOUS; SUBCUTANEOUS EVERY 5 MIN PRN
Status: DISCONTINUED | OUTPATIENT
Start: 2023-09-25 | End: 2023-09-25 | Stop reason: HOSPADM

## 2023-09-25 RX ORDER — DIPHENHYDRAMINE HCL 25 MG
25 CAPSULE ORAL EVERY 4 HOURS PRN
Status: DISCONTINUED | OUTPATIENT
Start: 2023-09-25 | End: 2023-09-25

## 2023-09-25 RX ORDER — SODIUM CHLORIDE, SODIUM LACTATE, POTASSIUM CHLORIDE, CALCIUM CHLORIDE 600; 310; 30; 20 MG/100ML; MG/100ML; MG/100ML; MG/100ML
INJECTION, SOLUTION INTRAVENOUS CONTINUOUS
Status: DISCONTINUED | OUTPATIENT
Start: 2023-09-25 | End: 2023-09-25 | Stop reason: HOSPADM

## 2023-09-25 RX ORDER — POLYETHYLENE GLYCOL 3350 17 G/17G
17 POWDER, FOR SOLUTION ORAL DAILY PRN
Status: DISCONTINUED | OUTPATIENT
Start: 2023-09-25 | End: 2023-09-26

## 2023-09-25 RX ORDER — HYDROMORPHONE HYDROCHLORIDE 1 MG/ML
0.8 INJECTION, SOLUTION INTRAMUSCULAR; INTRAVENOUS; SUBCUTANEOUS EVERY 2 HOUR PRN
Status: DISCONTINUED | OUTPATIENT
Start: 2023-09-25 | End: 2023-09-26

## 2023-09-25 RX ORDER — HYDROMORPHONE HYDROCHLORIDE 1 MG/ML
0.4 INJECTION, SOLUTION INTRAMUSCULAR; INTRAVENOUS; SUBCUTANEOUS EVERY 2 HOUR PRN
Status: DISCONTINUED | OUTPATIENT
Start: 2023-09-25 | End: 2023-09-25

## 2023-09-25 RX ORDER — KETOROLAC TROMETHAMINE 15 MG/ML
30 INJECTION, SOLUTION INTRAMUSCULAR; INTRAVENOUS EVERY 6 HOURS
Status: COMPLETED | OUTPATIENT
Start: 2023-09-25 | End: 2023-09-26

## 2023-09-25 RX ORDER — FAMOTIDINE 20 MG/1
20 TABLET, FILM COATED ORAL 2 TIMES DAILY
Status: DISCONTINUED | OUTPATIENT
Start: 2023-09-25 | End: 2023-09-26

## 2023-09-25 RX ORDER — DIPHENHYDRAMINE HYDROCHLORIDE 50 MG/ML
25 INJECTION INTRAMUSCULAR; INTRAVENOUS ONCE AS NEEDED
Status: DISCONTINUED | OUTPATIENT
Start: 2023-09-25 | End: 2023-09-25

## 2023-09-25 RX ORDER — BUPIVACAINE HYDROCHLORIDE 7.5 MG/ML
INJECTION, SOLUTION INTRASPINAL AS NEEDED
Status: DISCONTINUED | OUTPATIENT
Start: 2023-09-25 | End: 2023-09-25 | Stop reason: SURG

## 2023-09-25 RX ORDER — BUPIVACAINE HYDROCHLORIDE 5 MG/ML
INJECTION, SOLUTION EPIDURAL; INTRACAUDAL AS NEEDED
Status: DISCONTINUED | OUTPATIENT
Start: 2023-09-25 | End: 2023-09-25 | Stop reason: SURG

## 2023-09-25 RX ORDER — KETAMINE HYDROCHLORIDE 50 MG/ML
INJECTION, SOLUTION, CONCENTRATE INTRAMUSCULAR; INTRAVENOUS AS NEEDED
Status: DISCONTINUED | OUTPATIENT
Start: 2023-09-25 | End: 2023-09-25 | Stop reason: SURG

## 2023-09-25 RX ORDER — METOPROLOL TARTRATE 5 MG/5ML
2.5 INJECTION INTRAVENOUS ONCE
Status: DISCONTINUED | OUTPATIENT
Start: 2023-09-25 | End: 2023-09-25 | Stop reason: HOSPADM

## 2023-09-25 RX ORDER — ONDANSETRON 2 MG/ML
4 INJECTION INTRAMUSCULAR; INTRAVENOUS EVERY 6 HOURS PRN
Status: DISCONTINUED | OUTPATIENT
Start: 2023-09-25 | End: 2023-09-25

## 2023-09-25 RX ORDER — FAMOTIDINE 10 MG/ML
20 INJECTION, SOLUTION INTRAVENOUS 2 TIMES DAILY
Status: DISCONTINUED | OUTPATIENT
Start: 2023-09-25 | End: 2023-09-25

## 2023-09-25 RX ORDER — DEXAMETHASONE SODIUM PHOSPHATE 10 MG/ML
INJECTION, SOLUTION INTRAMUSCULAR; INTRAVENOUS AS NEEDED
Status: DISCONTINUED | OUTPATIENT
Start: 2023-09-25 | End: 2023-09-25 | Stop reason: SURG

## 2023-09-25 RX ORDER — TRAMADOL HYDROCHLORIDE 50 MG/1
50 TABLET ORAL EVERY 6 HOURS SCHEDULED
Status: DISCONTINUED | OUTPATIENT
Start: 2023-09-25 | End: 2023-09-26

## 2023-09-25 RX ORDER — DOCUSATE SODIUM 100 MG/1
100 CAPSULE, LIQUID FILLED ORAL 2 TIMES DAILY
Status: DISCONTINUED | OUTPATIENT
Start: 2023-09-25 | End: 2023-09-25

## 2023-09-25 RX ORDER — OXYCODONE HYDROCHLORIDE 5 MG/1
5 TABLET ORAL EVERY 4 HOURS PRN
Status: DISCONTINUED | OUTPATIENT
Start: 2023-09-25 | End: 2023-09-26

## 2023-09-25 RX ADMIN — KETAMINE HYDROCHLORIDE 25 MG: 50 INJECTION, SOLUTION, CONCENTRATE INTRAMUSCULAR; INTRAVENOUS at 11:42:00

## 2023-09-25 RX ADMIN — MIDAZOLAM HYDROCHLORIDE 2 MG: 1 INJECTION INTRAMUSCULAR; INTRAVENOUS at 11:00:00

## 2023-09-25 RX ADMIN — MIDAZOLAM HYDROCHLORIDE 2 MG: 1 INJECTION INTRAMUSCULAR; INTRAVENOUS at 10:58:00

## 2023-09-25 RX ADMIN — TRANEXAMIC ACID 1000 MG: 10 INJECTION, SOLUTION INTRAVENOUS at 11:15:00

## 2023-09-25 RX ADMIN — ONDANSETRON 4 MG: 2 INJECTION INTRAMUSCULAR; INTRAVENOUS at 12:56:00

## 2023-09-25 RX ADMIN — BUPIVACAINE HYDROCHLORIDE 7.5 MG: 5 INJECTION, SOLUTION EPIDURAL; INTRACAUDAL at 11:15:00

## 2023-09-25 RX ADMIN — BUPIVACAINE HYDROCHLORIDE 1.2 ML: 7.5 INJECTION, SOLUTION INTRASPINAL at 11:08:00

## 2023-09-25 RX ADMIN — SODIUM CHLORIDE, SODIUM LACTATE, POTASSIUM CHLORIDE, CALCIUM CHLORIDE: 600; 310; 30; 20 INJECTION, SOLUTION INTRAVENOUS at 12:56:00

## 2023-09-25 RX ADMIN — DEXAMETHASONE SODIUM PHOSPHATE 4 MG: 4 MG/ML VIAL (ML) INJECTION at 12:56:00

## 2023-09-25 RX ADMIN — KETAMINE HYDROCHLORIDE 25 MG: 50 INJECTION, SOLUTION, CONCENTRATE INTRAMUSCULAR; INTRAVENOUS at 11:27:00

## 2023-09-25 RX ADMIN — BUPRENORPHINE HYDROCHLORIDE 150 MCG: 0.32 INJECTION INTRAMUSCULAR; INTRAVENOUS at 11:15:00

## 2023-09-25 RX ADMIN — DEXAMETHASONE SODIUM PHOSPHATE 2 MG: 10 INJECTION, SOLUTION INTRAMUSCULAR; INTRAVENOUS at 11:15:00

## 2023-09-25 RX ADMIN — CEFAZOLIN SODIUM/WATER 2 G: 2 G/20 ML SYRINGE (ML) INTRAVENOUS at 11:17:00

## 2023-09-25 NOTE — CM/SW NOTE
Department  notified of request for [facility], aidin referrals started. Assigned CM/SW to follow up with pt/family on further discharge planning.         Jreilyn LÓPEZ [facility]

## 2023-09-25 NOTE — PHYSICAL THERAPY NOTE
PHYSICAL THERAPY JOINT EVALUATION - INPATIENT     Room Number: 356/356-A  Evaluation Date: 9/25/2023  Type of Evaluation: Initial  Physician Order: PT Eval and Treat    Presenting Problem: s/p R TKA on 9/25/23  Co-Morbidities : afib, polycythemia, cancer, OA, HTN  Reason for Therapy: Mobility Dysfunction and Discharge Planning    History related to current admission: Patient is a 61year old male admitted on 9/25/2023 for R TKA. ASSESSMENT   In this PT evaluation, the patient presents with the following impairments decreased strength, ROM, and balance. These impairments and comorbidities manifest themselves as functional limitations in independent bed mobility, transfers, and gait. The patient is below baseline and would benefit from skilled inpatient PT to address the above deficits to assist patient in returning to prior to level of function. Functional outcome measures completed include [facility]. The AM-PAC '6-Clicks' Inpatient Basic Mobility Short Form was completed and this patient is demonstrating a Approx Degree of Impairment: 41.77%  degree of impairment in mobility. Research supports that patients with this level of impairment may benefit from [address] [address] . DISCHARGE RECOMMENDATIONS  PT Discharge Recommendations: Home with home health PT    PLAN  PT Treatment Plan: Bed mobility; Endurance; Energy conservation;Patient education;Gait training;Family education;Range of motion;Strengthening;Stoop training;Stair training;Transfer training;Balance training  Rehab Potential : Good  Frequency (Obs): Daily  Number of Visits to Meet Established Goals: 1      Goal #1  Patient is able to demonstrate supine - sit EOB @ level: supervision     Goal #2  Patient is able to demonstrate transfers Sit to/from Stand at assistance level: supervison       Goal #3    Patient is able to ambulate 150 feet with assistive device at assistance level: supervision   Goal #4    Patient will negotiate 4 stairs/one curb w/ assistive device and supervision   Goal #5    Patient verbalizes and/or demonstrates all precautions and safety concerns independently    Goal #6      Goal Comments: Goals established on 9/25/2023    HOME SITUATION  Type of Home: House   Home Layout: Two level  Stairs to Enter : 2  Railing: No  Stairs to Bedroom: 12  Railing: Yes    Lives With: Spouse  Drives: Yes  Patient Owned Equipment: Rolling walker       Prior Level of [place]: Pt is typically independent with ADLs, ambulates with no AD, drives, and works. Pt lives with supportive spouse. SUBJECTIVE  \"This was better than I thought it was going to be\"       OBJECTIVE  Precautions: Bed/chair alarm  Fall Risk: Standard fall risk    WEIGHT BEARING RESTRICTION  Weight Bearing Restriction: R lower extremity        R Lower Extremity: Weight Bearing as Tolerated       PAIN ASSESSMENT  Rating: 3  Location: R knee  Management Techniques: Activity promotion;Relaxation;Repositioning    COGNITION  Overall Cognitive Status:  WFL - within functional limits    RANGE OF MOTION AND STRENGTH ASSESSMENT  Upper extremity ROM and strength are within functional limits     Lower extremity ROM is within functional limits, except RLE s/p TKA    Lower extremity strength is within functional limits, except RLE s/p TKA      BALANCE  Static Sitting: Good  Dynamic Sitting: Good  Static Standing: Fair -  Dynamic Standing: Fair -    ADDITIONAL TESTS                                    ACTIVITY TOLERANCE   Pt tolerated activity well, vitals stable                      O2 WALK       NEUROLOGICAL FINDINGS                        AM-PAC '6-Clicks' INPATIENT SHORT FORM - BASIC MOBILITY  How much difficulty does the patient currently have. ..   Patient Difficulty: Turning over in bed (including adjusting bedclothes, sheets and blankets)?: None   Patient Difficulty: Sitting down on and standing up from a chair with arms (e.g., wheelchair, bedside commode, etc.): A Little   Patient Difficulty: Moving from lying on back to sitting on the side of the bed?: A Little   How much help from another person does the patient currently need. .. Help from Another: Moving to and from a bed to a chair (including a wheelchair)?: A Little   Help from Another: Need to walk in hospital room?: A Little   Help from Another: Climbing 3-5 steps with a railing?: A Little       AM-PAC Score:  Raw Score: 19   Approx Degree of Impairment: 41.77%   Standardized Score (AM-PAC Scale): 45.44   CMS Modifier (G-Code): CK    FUNCTIONAL ABILITY STATUS  Gait Assessment   Functional Mobility/Gait Assessment  Gait Assistance: Supervision  Distance (ft): 50  Assistive Device: Rolling walker  Pattern: R Decreased stance time    Skilled Therapy Provided: Per RN okay to work with pt. Pt received in supine and was agreeable to PT session. Bed Mobility:  Rolling: NT  Supine to sit: supervision, HOB mildly elevated    Sit to supine: NT     Transfer Mobility:  Sit to stand: supervision, cues for hand placement  Stand to sit: supervision, cues for hand placement  Gait = Pt ambulated with RW and supervision     Therapist's Comments: Pt educated on role of therapy, goals for session, WBAT RLE, no pillow under affected knee, given HEP, and discharge planning. Pt verbalized understanding and denied further questions/concerns. Exercise/Education Provided:  Bed mobility  Functional activity tolerated  Gait training  ROM  Strengthening  Transfer training    Patient End of Session: Up in chair;Needs met;Call light within reach;RN aware of session/findings; All patient questions and concerns addressed;SCDs in place; Alarm set    Patient Evaluation Complexity Level:  History Moderate - 1 or 2 personal factors and/or co-morbidities   Examination of body systems Low - addressing 1-2 elements   Clinical Presentation Low - Stable   Clinical Decision Making Low Complexity       PT Session Time: 30 minutes  Therapeutic Activity: 10 minutes

## 2023-09-25 NOTE — ANESTHESIA POSTPROCEDURE EVALUATION
[facility]    Juan Baer Patient Status:  Outpatient in a Bed   Age/Gender 61year old male MRN [id number]   Location [address] [facility] Attending Daija Rubin MD   Hosp Day # 0 PCP Maggi Madison MD       Anesthesia Post-op Note    RIGHT TOTAL KNEE REPLACEMENT    Procedure Summary       Date: 09/25/23 Room [facility]  [facility]    Anesthesia Start: 2022 Anesthesia Stop: 7285    Procedure: RIGHT TOTAL KNEE REPLACEMENT (Right: Knee) Diagnosis: (RIGHT KNEE OSTEOARTHRITIS)    Surgeons: Daija Rubin MD Anesthesiologist: Lauren Argueta MD    Anesthesia Type: spinal ASA Status: 2            Anesthesia Type: spinal    Vitals Value Taken Time   /75 09/25/23 1254   Temp 97.7 09/25/23 1257   Pulse 69 09/25/23 1256   Resp 21 09/25/23 1256   SpO2 96 % 09/25/23 1256   Vitals shown include unfiled device data. Patient Location: PACU    Anesthesia Type: spinal    Airway Patency: patent    Postop Pain Control: adequate    Mental Status: mildly sedated but able to meaningfully participate in the post-anesthesia evaluation    Nausea/Vomiting: none    Cardiopulmonary/Hydration status: stable euvolemic    Complications: no apparent anesthesia related complications    Postop vital signs: stable    Dental Exam: Unchanged from Preop    Patient to be discharged from PACU when criteria met.

## 2023-09-25 NOTE — INTERVAL H&P NOTE
Pre-op Diagnosis: RIGHT KNEE OSTEOARTHRITIS    The above referenced PROGRESS NOTE was reviewed by Olive Allen MD on 9/25/2023, the patient was examined and no significant changes have occurred in the patient's condition since the H&P was performed. I discussed with the patient and/or legal representative the potential benefits, risks and side effects of this procedure; the likelihood of the patient achieving goals; and potential problems that might occur during recuperation. I discussed reasonable alternatives to the procedure, including risks, benefits and side effects related to the alternatives and risks related to not receiving this procedure. We will proceed with procedure as planned.

## 2023-09-25 NOTE — ANESTHESIA PROCEDURE NOTES
Spinal Block    Date/Time: 9/25/2023 11:00 AM    Performed by: Nadira White MD  Authorized by: Nadira White MD      General Information and Staff    Start Time:  9/25/2023 11:00 AM  End Time:  9/25/2023 11:10 AM  Anesthesiologist:  Nadira White MD  Performed by:   Anesthesiologist  Patient Location:  OR  Site identification: surface landmarks    Reason for Block: surgical anesthesia    Preanesthetic Checklist: patient identified, IV checked, risks and benefits discussed, monitors and equipment checked, pre-op evaluation, timeout performed, anesthesia consent and sterile technique used      Procedure Details    Patient Position:  Sitting  Prep: ChloraPrep    Monitoring:  Cardiac monitor, heart rate and continuous pulse ox  Approach:  Midline  Location:  L3-4  Injection Technique:  Single-shot    Needle    Needle Type:  Sprotte  Needle Gauge:  22 G  Needle Length:  3.5 in    Assessment    Sensory Level:  T8  Events: clear CSF, CSF aspirated, well tolerated and blood negative      Additional Comments

## 2023-09-25 NOTE — PHYSICAL THERAPY NOTE
PT orders received and chart reviewed. Pt unable to perform a SLR at this time and is reporting numbness in RLE. Will follow and re-attempt as able and appropriate. Pt and RN aware.

## 2023-09-25 NOTE — OPERATIVE REPORT
ANATOMIC ALIGNMENT TOTAL KNEE OPERATIVE REPORT:  DATE OF SURGERY: 9/25/2023    Jerald Crowe       [id number]     7/17/1960    PREOP DX: RIGHT  KNEE PRIMARY OSTEOARTHRITIS  POSTOP DX:RIGHT KNEE PRIMARY OSTEOARTHRITIS  PROCEDURE: RIGHT TOTAL KNEE REPLACEMENT (ANATOMIC ALIGNMENT)  SURGEON: Brian Miller MD  FIRST ASSIST: CATRINA CHEEMA PA-C  ANESTHESIA: SPINAL AND ADDUCTOR CANAL BLOCK  EBL: 50 CC  SPECIMEN: DISTAL FEMORAL AND PROXIMAL TIBIA CUT BONE  COMPLICATIONS: NONE  DRAIN: NONE  TT: 42 min. IMPLANT:  DEPUY ATTUNE RP PS FEMORAL SIZE  8 ,  RP TIBIA SIZE 8, RP PS POLY SIZE 6,  38 ANATOMIC PATELLA  PROCEDURE NOTE:  PATIENT WAS CORRECTLY IDENTIFIED AND OPERATIVE SITE WAS VERIFIED IN THE PREOPERATIVE HOLDING AREA. PATIENT WAS BROUGHT TO THE OR AND WAS PLACED IN SUPINE POSITION. PREOPERATIVE ANTIBIOTIC WAS GIVEN. NONOPERATIVE LEG HAD SCD APPLIED. ANESTHESIA WAS ADMINISTERED. ARMS WERE POSITIONED BY ANESTHESIA. OPERATIVE LEG WAS PREPPED AND DRAPED IN SURGICALLY STERILE AND STANDARD FASHION. TIMEOUT WAS DONE.  BLOOD WAS EXSANGUINATED. TOURNIQUET WAS INFLATED. ANTERIOR LINEAR INCISION WAS MADE. MEDIAL ARTHROTOMY WAS PERFORMED. LIMITED MEDIAL RELEASE WAS DONE. RETROPATELLAR FAT AND ANTERIOR FEMORAL FAT WERE REMOVED IN LIMITED FASHION. PATELLA WAS SUBLUXED LATERALLY. KNEE WAS FLEXED. OSTEOARTHRITIS WAS IDENTIFIED. ALL OSTEOPHYTES WERE REMOVED. ACL/PCL WERE REMOVED. DISTAL FEMORAL INTRAMEDULLARY DRILL WAS DONE. 9 mm DISTAL FEMORAL CUT WAS MADE BY USING SURFACE MATCHING ANATOMIC TECHNIQUE. PROXIMAL TIBIA WAS SUBLUXED ANTERIORLY. MEDIAL AND LATERAL MENISCI WERE REMOVED. PROXIMAL TIBIA CUT WAS MADE USING EXTRAMEDULLARY GUIDE TO MATCH THE DISTAL FEMORAL CUT IN PARALLEL FASHION. IT WAS A VARUS CUT. EXTENSION GAP WAS CHECKED USING AN EXTENSION SPACER. ATTENTION WAS TURNED BACK TO DISTAL FEMUR. KNEE WAS FLEXED. FEMUR WAS SIZED AT 8.    ANTERIOR-POSTERIOR CUT WAS MADE AT 0 DEG IN LINE WITH POSTERIOR FEMORAL CONDYLES. IT WAS A EQUAL CUT FROM BOTH POSTERIOR CONDYLES. FLEXION AND EXTENSION GAPS WERE CHECKED USING A SPACER BLOCK. GAPS WERE FOUND TO SATISFACTORY. FEMUR WAS FINISHED OFF WITH CHAMFER AND [address] CUTS IN USUAL FASHION. POSTERIOR FEMORAL OSTEOPHYTES WERE REMOVED. POSTERIOR CAPSULAR RELEASE WAS DONE CAREFULLY. PROXIMAL TIBIA WAS EXPOSED. TIBIA WAS SIZED at 8 AND ROTATION WAS SET USING MEDIAL 1/3 OF TIBIA TUBERCLE. TIBIA WAS PREPARED IN STANDARD FASHION. TRIAL COMPONENTS WERE PLACED. PATELLA WAS EVERTED AND THICKNESS MEASURED at 26 mm. PATELLAR CUT WAS MADE FREE HANDED FASHION. PATELLA WAS FINISHED. TRIAL COMPONENTS WERE INSERTED. KNEE WAS RANGED. GOOD FULL EXTENSION WAS ESTABLISHED AND FLEXION BEYOND BEYOND 120 DEG  KNEE WAS OBTAINED.  KNEE WAS STABLE TO VALGUS AND VARUS STRESS. PATELLA TRACKED NICELY. ALL THE TRIAL IMPLANTS WERE REMOVED. WOUND WAS IRRIGATED. LOCAL COCKTAIL WAS INFILTRATED CAREFULLY TO POSTERIOR CAPSULE, PERIOSTEUM, BOTH GUTTERS, AND SUPERFICIAL SOFT TISSUE. CEMENT WAS MIXED. CEMENT WAS CAREFULLY APPLIED TO BOTH BONE SURFACE AND IMPLANT SURFACE. FIRST TIBIA COMPONENT, THEN FEMORAL COMPONENT WERE APPLIED. EXCESS CEMENT WAS REMOVED. REAL POLY WAS INSERTED. KNEE WAS EXTENDED. PATELLAR COMPONENT WAS APPLIED. KNEE WAS HELD IN EXTENSION UNTIL CEMENT WAS SET. TOURNIQUET WAS DEFLATED. HEMOSTASIS WAS OBTAINED. IRRIGATION WAS DONE.    KNEE WAS RANGED AGAIN WITH GOOD STABILITY AND PATELLAR TRACKING. ANY LOOSE OR EXCESS CEMENT WAS REMOVED. ARTHROTOMY WAS CLOSED. SUBCUTANEOUS LAYER WAS CLOSED IN MULTIPLE LAYERS. SKIN WAS CLOSED. STERILE DRESSING WAS APPLIED. ALL COUNTS WERE CORRECT. PHYSICIAN ASSISTANT WAS NECESSARY FOR PATIENT POSITIONING, RETRACTION OF SOFT TISSUES, IMPLANT INSERTION, DISLOCATION AND REDUCTION OF THE KNEE JOINT, STABILITY TESTING, AND WOUND CLOSURE.   Olive Allen MD  9/25/2023

## 2023-09-25 NOTE — ANESTHESIA PROCEDURE NOTES
Regional Block    Date/Time: 9/25/2023 11:11 AM    Performed by: Marcela Bryant MD  Authorized by: Marcela Bryant MD      General Information and Staff    Start Time:  9/25/2023 11:11 AM  End Time:  9/25/2023 11:18 AM  Anesthesiologist:  Marcela Bryant MD  Performed by: Anesthesiologist  Patient Location:  OR    Block Placement: Post Induction  Site Identification: real time ultrasound guided and image stored and retrievable    Block site/laterality marked before start: site marked  Reason for Block: at surgeon's request and post-op pain management    Preanesthetic Checklist: 2 patient identifers, IV checked, risks and benefits discussed, monitors and equipment checked, pre-op evaluation, timeout performed, anesthesia consent, sterile technique used, no prohibitive neurological deficits and no local skin infection at insertion site      Procedure Details    Patient Position:  Supine  Prep: ChloraPrep    Monitoring:  Cardiac monitor, continuous pulse ox and blood pressure cuff  Block Type: Adductor canal  Laterality:  Right  Injection Technique:  Single-shot    Needle    Needle Type:  Short-bevel and echogenic  Needle Gauge:  21 G  Needle Length:  100 mm  Needle Localization:  Ultrasound guidance  Reason for Ultrasound Use: appropriate spread of the medication was noted in real time and no ultrasound evidence of intravascular and/or intraneural injection            Assessment    Injection Assessment:  Good spread noted, negative resistance, negative aspiration for heme, incremental injection and low pressure  Heart Rate Change: No    - Patient tolerated block procedure well without evidence of immediate block related complications.      Medications  9/25/2023 11:11 AM      Additional Comments    Medication:  Bupivacaine 0.375% 20mL with dexamethasone and buprenorphine

## 2023-09-25 NOTE — PLAN OF CARE
Received pt from pacu at 1345. Awake and alert. Family at bedside. Denies pain. Lower extremity weakness and decreased sensation due to anesthesia. Dressing dry and intact to right knee with gel ice in place. Pt and family verbalized understanding of POC and fall precautions. Plan home with [place] tomorrow.

## 2023-09-26 VITALS
DIASTOLIC BLOOD PRESSURE: 68 MMHG | OXYGEN SATURATION: 97 % | RESPIRATION RATE: 20 BRPM | BODY MASS INDEX: 33.1 KG/M2 | WEIGHT: 257.94 LBS | TEMPERATURE: 98 F | SYSTOLIC BLOOD PRESSURE: 148 MMHG | HEART RATE: 77 BPM | HEIGHT: 74 IN

## 2023-09-26 PROCEDURE — 97110 THERAPEUTIC EXERCISES: CPT

## 2023-09-26 PROCEDURE — 97116 GAIT TRAINING THERAPY: CPT

## 2023-09-26 PROCEDURE — 97530 THERAPEUTIC ACTIVITIES: CPT

## 2023-09-26 PROCEDURE — 97165 OT EVAL LOW COMPLEX 30 MIN: CPT

## 2023-09-26 RX ORDER — TRAMADOL HYDROCHLORIDE 50 MG/1
TABLET ORAL EVERY 6 HOURS PRN
Status: SHIPPED | COMMUNITY
Start: 2023-09-26

## 2023-09-26 RX ORDER — PSEUDOEPHEDRINE HCL 30 MG
100 TABLET ORAL 2 TIMES DAILY
Status: SHIPPED | COMMUNITY
Start: 2023-09-26

## 2023-09-26 RX ORDER — ACETAMINOPHEN 500 MG
1000 TABLET ORAL EVERY 6 HOURS
Status: SHIPPED | COMMUNITY
Start: 2023-09-26

## 2023-09-26 RX ORDER — CELECOXIB 200 MG/1
200 CAPSULE ORAL DAILY
Status: SHIPPED | COMMUNITY
Start: 2023-09-26

## 2023-09-26 RX ORDER — NALOXONE HYDROCHLORIDE 4 MG/.1ML
4 SPRAY NASAL AS NEEDED
Qty: 1 KIT | Refills: 0 | Status: SHIPPED | OUTPATIENT
Start: 2023-09-26

## 2023-09-26 RX ORDER — SEMAGLUTIDE 2.4 MG/.75ML
2.4 INJECTION, SOLUTION SUBCUTANEOUS WEEKLY
Status: SHIPPED | COMMUNITY
Start: 2023-09-26

## 2023-09-26 RX ORDER — OXYCODONE HYDROCHLORIDE 5 MG/1
5 TABLET ORAL EVERY 6 HOURS PRN
Status: SHIPPED | COMMUNITY
Start: 2023-09-26

## 2023-09-26 RX ORDER — ENOXAPARIN SODIUM 100 MG/ML
40 INJECTION SUBCUTANEOUS DAILY
Status: SHIPPED | COMMUNITY
Start: 2023-09-26

## 2023-09-26 NOTE — PLAN OF CARE
Pt A&O x 4 , on RA, HX JAIDA-CPAP , TELE-SB, Pain controlled with current regimen, up with x1ast, dressing-cdi, VSS, , IS.

## 2023-09-26 NOTE — PROGRESS NOTES
Anesthesia Pain Service    POD# 1 s/p TKA    Block type: Lower extremity: Adductor canal    Laterality: right    Injection technique: Single shot    Post op review: No evidence of immediate block related complications    Will sign off

## 2023-09-26 NOTE — CM/SW NOTE
09/26/23 1100   CM/SW Referral Data   Referral Source Social Work (self-referral)   Reason for Referral Discharge planning   Informant EMR;Clinical Staff Member;Patient;Spouse/Significant Other   Patient Info   Patient's Current Mental Status at Time of Assessment Alert;Oriented   Patient lives with Spouse/Significant other   Discharge Needs   Anticipated D/C needs Home health care       HOME SITUATION per PT eval  Type of Home: House   Home Layout: Two level  Stairs to Enter : 2  Railing: No  Stairs to Bedroom: 12  Railing: Yes     Lives With: Spouse  Drives: Yes  Patient Owned Equipment: Rolling walker     Prior Level of [place] per PT eval: Pt is typically independent with ADLs, ambulates with no AD, drives, and works. Pt lives with supportive spouse. Patient is a 62 y/o man admitted s/p TKR. Pt with pre-operative plan for [facility]. PT recommending [facility] at DC. Referral sent to [facility] via [address] and confirmation received that pt can be accepted. Met with pt and provided AIDIN information for [facility]. Pt agreeable with DC plan. He expressed concern that [facility] may not be in network with his HMO insurance plan. Message sent to [facility] via [address] and confirmation received that they are in network with plan. No further DC needs/concerns identified at this time. SW available should additional discharge needs arise.     Leonie Rao, [facility]  Discharge Planner  [phone] KATHER[facility] HOSPITALIST  Progress Note     Wm Smiley Patient Status:  Inpatient    1936 MRN [id number]   [facility] 7NE-A Attending Francisco Ryan MD   Hosp Day # 1 PCP PHYSICIAN NONSTAFF     Chief Complaint: slurred speeach    S: Flavia Orozco 100 mg Oral Daily   • Pantoprazole Sodium  40 mg Oral QAM AC   • atorvastatin  80 mg Oral Nightly   • Senna  17.2 mg Oral Nightly   • docusate sodium  100 mg Oral BID   • Heparin Sodium (Porcine)  5,000 Units Subcutaneous 2 times per day       ASSESSMENT /

## 2023-09-26 NOTE — PROGRESS NOTES
NURSING DISCHARGE NOTE    Discharged Home via Wheelchair. Accompanied by Spouse  Belongings Returned to patient from safe. Discharge instructions discussed with patient, he verbalized understanding.

## 2023-09-26 NOTE — PHYSICAL THERAPY NOTE
PHYSICAL THERAPY TREATMENT NOTE - INPATIENT    Room Number: 356/356-A     Session: 1     Number of Visits to Meet Established Goals: 1    Presenting Problem: s/p R TKA on 23  Co-Morbidities : Afib, CA, OA, HTN  ASSESSMENT       Patient currently does not meet criteria for skilled inpatient physical therapy services, however patient will continue to benefit from QID ambulation with nursing staff. Pt is hereby discharged from  [address] RN aware to re-order if there is a decline in mobility status. DISCHARGE RECOMMENDATIONS  PT Discharge Recommendations: Home with home health PT     PLAN  PT Treatment Plan: Bed mobility; Endurance; Energy conservation;Patient education;Gait training;Family education;Range of motion;Strengthening;Stoop training;Stair training;Transfer training;Balance training  Rehab Potential : Good  Frequency (Obs): Daily    CURRENT GOALS     Goal #1  Patient is able to demonstrate supine - sit EOB @ level: supervision      Goal #2  Patient is able to demonstrate transfers Sit to/from Stand at assistance level: supervison         Goal #3     Patient is able to ambulate 150 feet with assistive device at assistance level: supervision   Goal #4     Patient will negotiate 4 stairs/one curb w/ assistive device and supervision   Goal #5     Patient verbalizes and/or demonstrates all precautions and safety concerns independently    Goal #6        Goal Comments: Goals established on 2023 all goals achieved       SUBJECTIVE  \"Wow I will be back for the next one! \"    OBJECTIVE  Precautions: Bed/chair alarm    WEIGHT BEARING RESTRICTION  Weight Bearing Restriction: R lower extremity        R Lower Extremity: Weight Bearing as Tolerated       PAIN ASSESSMENT   Ratin  Location: R knee  Management Techniques:  Activity promotion;Relaxation;Repositioning    BALANCE Static Sitting: Good  Dynamic Sitting: Good           Static Standing: Fair -  Dynamic Standing: Fair -    ACTIVITY TOLERANCE                         O2 WALK         AM-PAC '6-Clicks' INPATIENT SHORT FORM - BASIC MOBILITY  How much difficulty does the patient currently have. .. Patient Difficulty: Turning over in bed (including adjusting bedclothes, sheets and blankets)?: None   Patient Difficulty: Sitting down on and standing up from a chair with arms (e.g., wheelchair, bedside commode, etc.): A Little   Patient Difficulty: Moving from lying on back to sitting on the side of the bed?: A Little   How much help from another person does the patient currently need. .. Help from Another: Moving to and from a bed to a chair (including a wheelchair)?: A Little   Help from Another: Need to walk in hospital room?: A Little   Help from Another: Climbing 3-5 steps with a railing?: A Little       AM-PAC Score:  Raw Score: 19   Approx Degree of Impairment: 41.77%   Standardized Score (AM-PAC Scale): 45.44   CMS Modifier (G-Code): CK    FUNCTIONAL ABILITY STATUS  Gait Assessment   Functional Mobility/Gait Assessment  Gait Assistance: Supervision  Distance (ft): 300  Assistive Device: Rolling walker  Pattern: R Decreased stance time  Stairs: Stairs;Stoop/curb  How Many Stairs: 12  Device: 1 Rail  Assist: Supervision  Pattern: Ascend and Descend  Ascend and Descend : Step to  Stoop/Curb: SBA with RW    Skilled Therapy Provided    Bed Mobility:  Rolling: NT   Supine<>Sit: NT   Sit<>Supine: NT     Transfer Mobility:  Sit<>Stand: Mod I   Stand<>Sit: Mdo I   Gait: SBA with RW    Therapist's Comments: reviewed HEP - completed environmental barrier training with spouse present.        THERAPEUTIC EXERCISES  Lower Extremity Ankle pumps  Heel raises  Leg slides  Quad sets  SLR     Upper Extremity  - open/close     Position Sitting     Repetitions   10   Sets   1     Patient End of Session: Up in chair;Needs met;Call light within reach;RN aware of session/findings; All patient questions and concerns addressed    PT Session Time: 45 minutes  Gait Trainin minutes  Therapeutic Activity: 15 minutes  Therapeutic Exercise: 15 minutes   Neuromuscular Re-education: 0 minutes

## 2023-09-26 NOTE — PROGRESS NOTES
Alert and oriented x 4. Right knee with Aquacel and spanda  in place, looks clean, dry and intact. Denied having any numbness or tingling to RLE. PT and OT saw patient, ok for discharge from therapy standpoint. Ortho PA seen and examined patient, also cleared patient for discharge. Plan for discharge to home with referral to [facility] care. Waiting for Hospitalist to clear patient.

## (undated) DEVICE — HOOD: Brand: FLYTE

## (undated) DEVICE — CONTAINER SPEC 4OZ POLYPR GRAD LEAK RESIST

## (undated) DEVICE — Device: Brand: STABLECUT®

## (undated) DEVICE — HOOD, PEEL-AWAY: Brand: FLYTE

## (undated) DEVICE — STOCKINETTE SUR W12XL48IN STD HLLW IMPERV OTR

## (undated) DEVICE — DISPOSABLE TOURNIQUET CUFF SINGLE BLADDER, DUAL PORT AND QUICK CONNECT CONNECTOR: Brand: COLOR CUFF

## (undated) DEVICE — 450 ML BOTTLE OF 0.05% CHLORHEXIDINE GLUCONATE IN 99.95% STERILE WATER FOR IRRIGATION, USP AND APPLICATOR.: Brand: IRRISEPT ANTIMICROBIAL WOUND LAVAGE

## (undated) DEVICE — PENCIL ES BTTN SWCH W/ TIP HOLSTER E-Z CLN

## (undated) DEVICE — SUPER SPONGES,MEDIUM: Brand: KERLIX

## (undated) DEVICE — ATTUNE PINNING SYSTEM: Brand: ATTUNE

## (undated) DEVICE — GOWN SURG AERO CHROME XXL

## (undated) DEVICE — SYRINGE MED 20ML STD CLR PLAS LL TIP N CTRL

## (undated) DEVICE — BANDAGE COHESIVE W4INXL5YD TAN E POR SLF ADH

## (undated) DEVICE — SLEEVE COMPR M KNEE LEN SGL USE KENDALL SCD

## (undated) DEVICE — STERILE POLYISOPRENE POWDER-FREE SURGICAL GLOVES: Brand: PROTEXIS

## (undated) DEVICE — GAMMEX® PI HYBRID SIZE 8.5, STERILE POWDER-FREE SURGICAL GLOVE, POLYISOPRENE AND NEOPRENE BLEND: Brand: GAMMEX

## (undated) DEVICE — STANDARD HYPODERMIC NEEDLE,POLYPROPYLENE HUB: Brand: MONOJECT

## (undated) DEVICE — LIGHT HANDLE

## (undated) DEVICE — SUTURE VICRYL 1 OS-6

## (undated) DEVICE — NON-ADHERENT STRIPS,OIL EMULSION: Brand: CURITY

## (undated) DEVICE — 3M™ IOBAN™ 2 ANTIMICROBIAL INCISE DRAPE 6651EZ: Brand: IOBAN™ 2

## (undated) DEVICE — STERILE POLYISOPRENE POWDER-FREE SURGICAL GLOVES WITH EMOLLIENT COATING: Brand: PROTEXIS

## (undated) DEVICE — TOTAL KNEE CDS: Brand: MEDLINE INDUSTRIES, INC.

## (undated) DEVICE — PAD KNEE POS PROTCT MEM GEL FOAM BOOT AND

## (undated) DEVICE — SUTURE STRATAFIX SYMMTRC PDS + SZ 1 L18IN

## (undated) DEVICE — STERILE HOOK LOCK LATEX FREE ELASTIC BANDAGE 6INX5YD: Brand: HOOK LOCK™

## (undated) DEVICE — GAUZE SPONGES,USP TYPE VII GAUZE, 12 PLY: Brand: CURITY

## (undated) DEVICE — #15 STERILE STAINLESS BLADE: Brand: STERILE STAINLESS BLADES

## (undated) DEVICE — DERMABOND LIQUID ADHESIVE

## (undated) DEVICE — SOLUTION IRRIG 1000ML 0.9% NACL USP BTL

## (undated) DEVICE — BOWL BNE CEM MIX DISP QUIK-VAC

## (undated) DEVICE — UNIVERSAL STERIBUMP® STERILE (5/CASE): Brand: UNIVERSAL STERIBUMP®

## (undated) DEVICE — SOL  .9 1000ML BTL

## (undated) DEVICE — SUTURE ETHILON 5-0 PS-3

## (undated) DEVICE — PTFE COATED BLADE 4': Brand: MEDLINE

## (undated) DEVICE — WRAP COMPR UNIV KNEE HOT CLD GEL MICWV AND

## (undated) DEVICE — APPLICATOR SKIN PREP 26ML HI LT ORNG 2% CHG

## (undated) DEVICE — HOOK LOCK LATEX FREE ELASTIC BANDAGE 2INX5YD

## (undated) DEVICE — SUTURE VCRL SZ 2-0 L27IN ABSRB UD L36MM CP-1

## (undated) DEVICE — SUTURE VCRL SZ 1 L27IN ABSRB UD L48MM CPX 1/2

## (undated) DEVICE — SOLUTION IRRIG 3000ML 0.9% NACL FLX CONT

## (undated) DEVICE — PADDING CAST COTTON STER 3

## (undated) DEVICE — DRAPE,U/SHT,SPLIT,FILM,60X84,STERILE: Brand: MEDLINE

## (undated) DEVICE — ATTAHJA VAC WITH 22MM CONNECTR

## (undated) DEVICE — STRETCH BANDAGE: Brand: CURITY

## (undated) DEVICE — UPPER EXTREMITY CDS-LF: Brand: MEDLINE INDUSTRIES, INC.

## (undated) NOTE — LETTER
OUTSIDE TESTING RESULT REQUEST     IMPORTANT: FOR YOUR IMMEDIATE ATTENTION  Please FAX all test results listed below to: [phone]     Testing already done on or about: 23     * * * * If testing is NOT complete, arrange with patient A.S.A.P. * * * *    Patient Name: Francoise Alvarado  Surgery Date: 2023  Medical Record: [id number]  CSN: [id number]  : 1960 - A: 61 y     Sex: male  Surgeon(s):  Henny Jensen MD  Procedure: RIGHT TOTAL KNEE REPLACEMENT  Anesthesia Type: Spinal     Surgeon: Henny Jensen MD     The following Testing and Time Line are REQUIRED PER ANESTHESIA     EKG READ AND SIGNED WITHIN   90 days      Thank Misti Peacock,   Sent by: Ej Travis

## (undated) NOTE — ED AVS SNAPSHOT
Amber Megan   MRN: [id number]    Department:  [facility] in [place]   Date of Visit:  10/16/2018           Disclosure     Insurance plans vary and the physician(s) referred by the ER may not be covered by your plan.  Please co tell this physician (or your personal doctor if your instructions are to return to your personal doctor) about any new or lasting problems. The primary care or specialist physician will see patients referred from the [facility] Emergency Department.  Talita Dean

## (undated) NOTE — LETTER
OUTSIDE TESTING RESULT REQUEST     IMPORTANT: FOR YOUR IMMEDIATE ATTENTION  Please FAX all test results listed below to: [phone]     Testing already done on or about: 2023     * * * * If testing is NOT complete, arrange with patient A.S.A.P. * * * *      Patient Name: Jason Balderas  Surgery Date: 2023  Medical Record: [id number]  CSN: [id number]  : 1960 - A: 61 y     Sex: male  Surgeon(s):  Ryland Barrett MD  Procedure: RIGHT TOTAL KNEE REPLACEMENT  Anesthesia Type: Spinal     Surgeon: Ryland Barrett MD     The following Testing and Time Line are REQUIRED PER ANESTHESIA     EKG READ AND SIGNED WITHIN   90 days      Thank Beth Raphael,   Sent by: Estephanie Campos RN